# Patient Record
Sex: MALE | Race: AMERICAN INDIAN OR ALASKA NATIVE
[De-identification: names, ages, dates, MRNs, and addresses within clinical notes are randomized per-mention and may not be internally consistent; named-entity substitution may affect disease eponyms.]

---

## 2019-02-19 ENCOUNTER — HOSPITAL ENCOUNTER (EMERGENCY)
Dept: HOSPITAL 5 - ED | Age: 24
Discharge: HOME | End: 2019-02-19
Payer: SELF-PAY

## 2019-02-19 VITALS — DIASTOLIC BLOOD PRESSURE: 65 MMHG | SYSTOLIC BLOOD PRESSURE: 106 MMHG
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
## 2019-02-19 DIAGNOSIS — S09.90XA: Primary | ICD-10-CM
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
## 2019-02-19 DIAGNOSIS — F17.200: ICD-10-CM
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
## 2019-02-19 PROCEDURE — 99282 EMERGENCY DEPT VISIT SF MDM: CPT

## 2019-02-19 NOTE — EMERGENCY DEPARTMENT REPORT
Head Injury w/o Laceration





- HPI


Chief Complaint: Head Injury


Stated Complaint: HEAD INJURY


Time Seen by Provider: 02/19/19 07:39


Occurred When: Yesterday


Location: Parietal, Occipital


Severity: mild


Head Inj w/o Lac: Yes Headache, No Loss of Consciousness, No Nausea, No Blurred 

Vision, No Altered Mental Status, No Focal Deficit, No Swelling, No Bruising, No

Break in Skin, No Bleeding


Other History: Patient is a 23-year-old healthy looking female male speaking in 

clear sentences presents ED complaining that he was sitting on the couch at home

when he had the ceiling tile fall and hit the back of his head.  Patient was 

ambulatory after the incident, had no loss of consciousness.  Presents for 

evaluation





ED General PMH





- Past Medical History


General Medical History: no medical history


Surgical History: no surgical history





- Family History


Significant Family History: no pertinent family hx





- Social History


Smoking Status: Light Tobacco Smoker





ED Neuro ROS





- Review of Systems


Constitutional: no symptoms reported


Eyes (ROS): no symptoms reported.  denies: blurred vision, decreased acuity, 

foreign body sensation, vision change


Ears, Nose, Mouth, Throat: no symptoms reported


Respiratory: no symptoms reported


Cardiology: no symptoms reported


Gastrointestinal/Abdominal: no symptoms reported


Genitourinary: no symptoms reported


Musculoskeletal: no symptoms reported


Skin: no symptoms reported


Neurological: no symptoms reported


Endocrine: no symptoms reported


Hematologic/Lymphatic: no symptoms reported


All Other Systems: Reviewed and Negative





Head Injury W/O Lac Exam





- Exam


General: 


Vital signs noted. No distress. Alert and acting appropriately.





Head: Yes Pupils are PERRL, No Hemotympanum, No Hematoma/Ecchymosis, No 

Epistaxis, No Stepoff/Deformity, No Laceration, No Abrasion


Chest, Abd, & Ext: Yes Clear Lung Sounds, Yes Regular Heart Rhythm, No Neck 

Pain, No Chest Injury/Pain, No Heart Murmur, No Abdominal Tenderness, No Back 

Tenderness, No Extremity Injury


Neuroligical (Head Inj W/O Lac: Yes Normal Speech, Yes Normal Gait, No Lethargy,

No Disorientation, No Focal Numbness, No Focal Weakness





ED Disposition


Clinical Impression: 


 Headache, Minor head injury without loss of consciousness





Disposition: DC-01 TO HOME OR SELFCARE


Is pt being admited?: No


Does the pt Need Aspirin: No


Condition: Stable


Instructions:  Minor Head Injury (ED), Acute Headache (ED)


Additional Instructions: 


Make sure to follow up with the primary care physician as discussed.


Take all your medications as you've been prescribed.


If you have any worsening symptoms or develop new symptoms please return to ED 

immediately.


Prescriptions: 


Ibuprofen [Motrin] 800 mg PO Q8HR PRN #30 tablet


 PRN Reason: Pain , Severe (7-10)


Referrals: 


[facility] [place] [Primary Care Provider] - 3-5 Days


[facility] [Outside] - 3-5 Days


[facility] [Outside] - 3-5 Days


[facility] [Outside] - 3-5 Days


Forms:  Accompanied Note, Work/School Release Form(ED)


Time of Disposition: 07:45